# Patient Record
Sex: FEMALE | URBAN - METROPOLITAN AREA
[De-identification: names, ages, dates, MRNs, and addresses within clinical notes are randomized per-mention and may not be internally consistent; named-entity substitution may affect disease eponyms.]

---

## 2021-11-19 ENCOUNTER — APPOINTMENT (RX ONLY)
Dept: URBAN - METROPOLITAN AREA CLINIC 168 | Facility: CLINIC | Age: 41
Setting detail: DERMATOLOGY
End: 2021-11-19

## 2021-11-19 DIAGNOSIS — L65.8 OTHER SPECIFIED NONSCARRING HAIR LOSS: ICD-10-CM

## 2021-11-19 DIAGNOSIS — L29.8 OTHER PRURITUS: ICD-10-CM

## 2021-11-19 DIAGNOSIS — L29.89 OTHER PRURITUS: ICD-10-CM

## 2021-11-19 PROCEDURE — ? ADDITIONAL NOTES

## 2021-11-19 PROCEDURE — 99203 OFFICE O/P NEW LOW 30 MIN: CPT

## 2021-11-19 PROCEDURE — ? COUNSELING

## 2021-11-19 ASSESSMENT — LOCATION SIMPLE DESCRIPTION DERM
LOCATION SIMPLE: LEFT PLANTAR SURFACE
LOCATION SIMPLE: LEFT FOREHEAD
LOCATION SIMPLE: RIGHT PLANTAR SURFACE
LOCATION SIMPLE: RIGHT FOREHEAD
LOCATION SIMPLE: FRONTAL SCALP

## 2021-11-19 ASSESSMENT — LOCATION ZONE DERM
LOCATION ZONE: FACE
LOCATION ZONE: SCALP
LOCATION ZONE: FEET

## 2021-11-19 ASSESSMENT — LOCATION DETAILED DESCRIPTION DERM
LOCATION DETAILED: RIGHT MEDIAL PLANTAR MIDFOOT
LOCATION DETAILED: MEDIAL FRONTAL SCALP
LOCATION DETAILED: RIGHT FOREHEAD
LOCATION DETAILED: LEFT MEDIAL PLANTAR MIDFOOT
LOCATION DETAILED: LEFT FOREHEAD

## 2021-11-19 NOTE — HPI: RASH
What Type Of Note Output Would You Prefer (Optional)?: Bullet Format
How Severe Is Your Rash?: mild
Is This A New Presentation, Or A Follow-Up?: Rash
Additional History: She notes just itchy, no actual rash, seems to occur only after a long bath as her feet dry. Vaseline helps.

## 2021-11-19 NOTE — PROCEDURE: ADDITIONAL NOTES
Additional Notes: Discussed otc urea bare and/or Vaseline after bathing.
Detail Level: Simple
Render Risk Assessment In Note?: no
Patient Management Risk Assessment: Low
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 Discussed possibility of androgenic alopecia, however strongly favor traction as dx. Offered bx to differentiate as androgenic alopecia would have alternate treatment options and pt felt this is unlikely the dx, is comfortable with the traction dx, and declined bx.
None

## 2021-11-19 NOTE — HPI: HAIR LOSS
Previous Labs: Yes
How Did The Hair Loss Occur?: gradual in onset
How Severe Is Your Hair Loss?: mild
Additional History: She notes the hairloss 20+ years ago reversed along with normalization of her thyroid function. Her hair regrew everywhere but at the lower anterior temporal scalp.
Lab Details: within the past 1 year, normal thyroid function now, but 20+ years ago she had widespread hairloss that was attributed to abnormal thyroid function at that time.

## 2023-07-19 NOTE — PROCEDURE: MIPS QUALITY
Check labs between 8 - 9 am     Start spironolactone 25 mg daily.   
Quality 226: Preventive Care And Screening: Tobacco Use: Screening And Cessation Intervention: Patient screened for tobacco use and is an ex/non-smoker
Detail Level: Detailed
Quality 431: Preventive Care And Screening: Unhealthy Alcohol Use - Screening: Patient not identified as an unhealthy alcohol user when screened for unhealthy alcohol use using a systematic screening method
Quality 110: Preventive Care And Screening: Influenza Immunization: Influenza immunization was not ordered or administered, reason not given